# Patient Record
Sex: MALE | Race: WHITE
[De-identification: names, ages, dates, MRNs, and addresses within clinical notes are randomized per-mention and may not be internally consistent; named-entity substitution may affect disease eponyms.]

---

## 2018-07-25 ENCOUNTER — HOSPITAL ENCOUNTER (OUTPATIENT)
Dept: HOSPITAL 45 - C.PET | Age: 73
Discharge: HOME | End: 2018-07-25
Attending: THORACIC SURGERY (CARDIOTHORACIC VASCULAR SURGERY)
Payer: COMMERCIAL

## 2018-07-25 DIAGNOSIS — C67.0: Primary | ICD-10-CM

## 2018-07-25 DIAGNOSIS — R91.1: ICD-10-CM

## 2018-07-25 NOTE — DIAGNOSTIC IMAGING REPORT
PET/CT



CLINICAL HISTORY: Solitary pulmonary nodule. History of bladder cancer.



TECHNIQUE:  A PET/CT was performed from the skull base through the upper thighs

following intravenous injection of 12.71 mCi of F 18 FDG IV. The injection was

performed at 8:31 AM on July 25, 2018 and imaging began at 8:33 AM on July 25, 2018. Unenhanced CT was performed for attenuation correction purposes and

anatomic localization.



COMPARISON STUDY:  None available at time of interpretation.



FINDINGS: 



Head and neck: No enlarged cervical lymph nodes are noted. However, there is

moderate FDG uptake within several nonenlarged right supraclavicular lymph nodes

with an SUV max of 4.5. These nodes measure up to 7 mm in size and are shown on

axial image 39 of 267.



Chest: Moderate emphysema is noted. Note is made of an irregular 4.9 x 4.1 cm

FDG avid right lower lobe mass with an SUV max of 12.5. This mass may be

partially necrotic. There is mild airspace opacity within the anterior and

lateral basilar segments of the right lower lobe which is likely

postobstructive. Note is made of an indeterminate 1.5 cm groundglass opacity

within the right lower lobe on image 119. This has no significant FDG uptake.

Lungs are suboptimally assessed given respiratory motion. Heart is moderately

enlarged. There are multiple mildly enlarged FDG avid mediastinal and right

hilar lymph nodes. Index precarinal node shown image 70 measures 1.1 cm in short

extremity and has an extremely max of 10. Subcarinal lymph node has a short axis

diameter of 1.5 cm. Right hilar node measures 1.5 x 0.7 cm.



Abdomen and Pelvis: No abdominal or pelvic lymphadenopathy is present. There is

no abnormal FDG uptake within the abdomen or pelvis. Intrarenal abdominal aorta

is ectatic, measuring 2.9 cm. Fat-containing left inguinal hernia is present.

There is colonic diverticulosis without evidence for acute diverticulitis.



Musculoskeletal: Innumerable sclerotic foci are noted within the visualized

skeletal structures. A pathologic fracture is noted. A 2.4 cm right sacral ala

lesion has an SUV max of 9.6. There are numerous vertebral lesions. No epidural

extension of tumor is identified on this examination. 



IMPRESSION:  



1. FDG avid 4.9 x 4.1 cm right lower lobe mass. The appearance favors

bronchogenic carcinoma however metastatic bladder cancer could appear similar.



2. Extensive skeletal metastatic disease with innumerable FDG avid sclerotic

skeletal lesions. No pathologic fracture.



3. FDG avid mediastinal, right hilar and right supraclavicular lymph nodes

consistent with danielle spread of disease. No abdominal or pelvic lymphadenopathy.



4. Moderate emphysema.



5. Indeterminate 1.5 cm groundglass opacity within the right lower lobe which

could reflect mild airspace disease or a neoplasm.







Electronically signed by:  Rodrigo Frye M.D.

7/25/2018 1:25 PM



Dictated Date/Time:  7/25/2018 11:47 AM